# Patient Record
Sex: FEMALE | Race: WHITE | Employment: FULL TIME | URBAN - METROPOLITAN AREA
[De-identification: names, ages, dates, MRNs, and addresses within clinical notes are randomized per-mention and may not be internally consistent; named-entity substitution may affect disease eponyms.]

---

## 2017-03-03 RX ORDER — ESTAZOLAM 2 MG/1
TABLET ORAL
Qty: 21 TABLET | Refills: 5 | Status: SHIPPED | OUTPATIENT
Start: 2017-03-03 | End: 2017-06-23

## 2017-06-23 ENCOUNTER — TELEPHONE (OUTPATIENT)
Dept: FAMILY MEDICINE CLINIC | Facility: CLINIC | Age: 22
End: 2017-06-23

## 2017-06-23 RX ORDER — ESTAZOLAM 2 MG/1
1 TABLET ORAL
Qty: 84 TABLET | Refills: 1 | OUTPATIENT
Start: 2017-06-23 | End: 2017-12-07

## 2017-07-26 ENCOUNTER — TELEPHONE (OUTPATIENT)
Dept: FAMILY MEDICINE CLINIC | Facility: CLINIC | Age: 22
End: 2017-07-26

## 2017-07-26 NOTE — TELEPHONE ENCOUNTER
Mom would like patient to get the HPV series. She says patient needs it for her job. She needs to start series in the next 3 weeks. Please call mom back to schedule.

## 2017-07-26 NOTE — TELEPHONE ENCOUNTER
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    Order placed

## 2017-08-02 ENCOUNTER — NURSE ONLY (OUTPATIENT)
Dept: FAMILY MEDICINE CLINIC | Facility: CLINIC | Age: 22
End: 2017-08-02

## 2017-08-02 PROCEDURE — 90651 9VHPV VACCINE 2/3 DOSE IM: CPT | Performed by: FAMILY MEDICINE

## 2017-08-02 PROCEDURE — 90471 IMMUNIZATION ADMIN: CPT | Performed by: FAMILY MEDICINE

## 2017-08-02 NOTE — PROGRESS NOTES
Patient here for 1st HPV vaccine  Information sheet provided to patient - vaccine schedule provided to patient

## 2017-09-22 ENCOUNTER — TELEPHONE (OUTPATIENT)
Dept: FAMILY MEDICINE CLINIC | Facility: CLINIC | Age: 22
End: 2017-09-22

## 2017-09-22 NOTE — TELEPHONE ENCOUNTER
Forms from a physical done by a physician at [facility] where completed - patient asked if Dr. Magdalena Parada would also the forms.  Dr. Magdalena Parada advised that patient has not been seen by him since 2 2016 and he would need to see the patient for an OV to have the

## 2017-09-25 ENCOUNTER — CHARTING TRANS (OUTPATIENT)
Dept: OTHER | Age: 22
End: 2017-09-25

## 2017-11-10 ENCOUNTER — TELEPHONE (OUTPATIENT)
Dept: FAMILY MEDICINE CLINIC | Facility: CLINIC | Age: 22
End: 2017-11-10

## 2017-11-15 ENCOUNTER — NURSE ONLY (OUTPATIENT)
Dept: FAMILY MEDICINE CLINIC | Facility: CLINIC | Age: 22
End: 2017-11-15

## 2017-11-15 PROCEDURE — 90651 9VHPV VACCINE 2/3 DOSE IM: CPT | Performed by: FAMILY MEDICINE

## 2017-11-15 PROCEDURE — 90471 IMMUNIZATION ADMIN: CPT | Performed by: FAMILY MEDICINE

## 2017-12-07 RX ORDER — NORETHINDRONE ACETATE AND ETHINYL ESTRADIOL 1; .02 MG/1; MG/1
TABLET ORAL
Qty: 63 TABLET | Refills: 1 | Status: SHIPPED | OUTPATIENT
Start: 2017-12-07 | End: 2018-05-22

## 2018-02-14 ENCOUNTER — TELEPHONE (OUTPATIENT)
Dept: FAMILY MEDICINE CLINIC | Facility: CLINIC | Age: 23
End: 2018-02-14

## 2018-02-14 NOTE — TELEPHONE ENCOUNTER
Routing to Dr. Jeffery Garcia. Please advise on HPV order. Hpv Virus Vaccine 9 Adamaris Im 11/15/2017, 8/2/2017     Appointment scheduled for 3/14/18 (4 months after the 2nd injection). Thank you.

## 2018-03-13 ENCOUNTER — NURSE ONLY (OUTPATIENT)
Dept: FAMILY MEDICINE CLINIC | Facility: CLINIC | Age: 23
End: 2018-03-13

## 2018-03-13 PROCEDURE — 90471 IMMUNIZATION ADMIN: CPT | Performed by: FAMILY MEDICINE

## 2018-03-13 PROCEDURE — 90651 9VHPV VACCINE 2/3 DOSE IM: CPT | Performed by: FAMILY MEDICINE

## 2018-03-13 NOTE — PROGRESS NOTES
Patient in office for HPV # 3 patient states no issues with number 1 or 2. Dose administerd in the left deltoid. Patient instructed to wait 15 minutes she is agreeable with this plan. After 15 minutes. Patient left, no concerns.

## 2018-05-22 RX ORDER — NORETHINDRONE ACETATE AND ETHINYL ESTRADIOL 1; 20 MG/1; UG/1
TABLET ORAL
Qty: 63 TABLET | Refills: 0 | Status: SHIPPED | OUTPATIENT
Start: 2018-05-22 | End: 2018-08-16

## 2018-05-22 NOTE — TELEPHONE ENCOUNTER
Please let patient know or leave message that her birth control has been refilled. She has not been seen here in almost 2 years. Please have her set up a wellness visit in the near future.  Thanks

## 2018-05-25 ENCOUNTER — MED REC SCAN ONLY (OUTPATIENT)
Dept: FAMILY MEDICINE CLINIC | Facility: CLINIC | Age: 23
End: 2018-05-25

## 2018-08-16 RX ORDER — NORETHINDRONE ACETATE AND ETHINYL ESTRADIOL 1; 20 MG/1; UG/1
TABLET ORAL
Qty: 30 TABLET | Refills: 0 | Status: SHIPPED | OUTPATIENT
Start: 2018-08-16 | End: 2018-08-20

## 2018-08-16 NOTE — TELEPHONE ENCOUNTER
Please let patient know or leave message that Lalit Dejesus was refilled. She has not been seen in > 2 years. She will need a wellness visit in the very near future.  We can not continue to fill the medication with our follow up, thanks

## 2018-08-16 NOTE — TELEPHONE ENCOUNTER
Left message on patient's voice mail that the refill has been sent to the pharmacy.  Patient has an upcoming appointment on 8 20 2018

## 2018-08-16 NOTE — TELEPHONE ENCOUNTER
Last refilled on 5/22/18 for # 61 with 0 refills  Last seen on 8/16/16 (By Dr Ananda Medel)  Future Appointments  Date Time Provider Alix Coombs   8/20/2018 10:00 AM Erin Paige MD [facility] Alena Jonas        Thank you.

## 2018-08-20 ENCOUNTER — OFFICE VISIT (OUTPATIENT)
Dept: FAMILY MEDICINE CLINIC | Facility: CLINIC | Age: 23
End: 2018-08-20
Payer: COMMERCIAL

## 2018-08-20 VITALS
TEMPERATURE: 98 F | DIASTOLIC BLOOD PRESSURE: 80 MMHG | HEART RATE: 66 BPM | SYSTOLIC BLOOD PRESSURE: 120 MMHG | WEIGHT: 114.38 LBS | HEIGHT: 61.5 IN | RESPIRATION RATE: 14 BRPM | BODY MASS INDEX: 21.32 KG/M2

## 2018-08-20 DIAGNOSIS — Z00.00 WELL ADULT EXAM: Primary | ICD-10-CM

## 2018-08-20 PROCEDURE — 99395 PREV VISIT EST AGE 18-39: CPT | Performed by: FAMILY MEDICINE

## 2018-08-20 RX ORDER — NORETHINDRONE ACETATE AND ETHINYL ESTRADIOL 1; .02 MG/1; MG/1
1 TABLET ORAL
Qty: 90 TABLET | Refills: 3 | Status: SHIPPED | OUTPATIENT
Start: 2018-08-20 | End: 2019-07-08

## 2018-08-20 NOTE — PROGRESS NOTES
Ady Garcia is a 21year old female.     CC:  Patient presents with:  Physical: per pt       HPI:  Patient is here for yearly, wellness exam  Last Lipid: never  Last colonoscopy: na      Immunization History  Administered            Date(s) Administ Resp 14   Ht 61.5\"   Wt 114 lb 6.4 oz   LMP 08/19/2018   BMI 21.27 kg/m²    Reviewed by Paramjit Kessler M.D.     Physical Exam:  GEN: well developed, well nourished, in no apparent distress  EYE: B conjunctiva and lids normal  HENT: normocephalic; normal nose,

## 2018-08-29 ENCOUNTER — MED REC SCAN ONLY (OUTPATIENT)
Dept: FAMILY MEDICINE CLINIC | Facility: CLINIC | Age: 23
End: 2018-08-29

## 2018-08-29 LAB
A1C: 4.9 %
GLUCOSE BLOOD: 84
HDL CHOLESTEROL: 57 MG/DL
LDL CHOLESTEROL: 47 MG/DL (ref ?–130)
TOTAL CHOLESTEROL: 141 MG/DL (ref ?–200)
TRIGLYCERIDES: 185 MG/DL

## 2018-11-02 VITALS
RESPIRATION RATE: 18 BRPM | SYSTOLIC BLOOD PRESSURE: 114 MMHG | BODY MASS INDEX: 20.82 KG/M2 | WEIGHT: 117.5 LBS | HEART RATE: 64 BPM | TEMPERATURE: 98.8 F | HEIGHT: 63 IN | DIASTOLIC BLOOD PRESSURE: 66 MMHG

## 2019-02-08 ENCOUNTER — OFFICE VISIT (OUTPATIENT)
Dept: FAMILY MEDICINE CLINIC | Facility: CLINIC | Age: 24
End: 2019-02-08
Payer: COMMERCIAL

## 2019-02-08 VITALS
DIASTOLIC BLOOD PRESSURE: 68 MMHG | RESPIRATION RATE: 16 BRPM | WEIGHT: 118 LBS | HEIGHT: 61 IN | HEART RATE: 83 BPM | SYSTOLIC BLOOD PRESSURE: 112 MMHG | BODY MASS INDEX: 22.28 KG/M2 | OXYGEN SATURATION: 99 % | TEMPERATURE: 98 F

## 2019-02-08 DIAGNOSIS — H93.8X2 SENSATION OF FULLNESS IN LEFT EAR: Primary | ICD-10-CM

## 2019-02-08 PROCEDURE — 99213 OFFICE O/P EST LOW 20 MIN: CPT | Performed by: FAMILY MEDICINE

## 2019-02-08 RX ORDER — SPIRONOLACTONE 25 MG/1
25 TABLET ORAL
Refills: 2 | COMMUNITY
Start: 2018-12-03 | End: 2021-12-03

## 2019-02-08 NOTE — PROGRESS NOTES
HPI:    Patient ID: Kaleb Guan is a 21year old female.     Patient presents with:  Ear Problem: left sided, decreased hearing and patients states she hears \"popping\" noise - For a few months intermittently      HPI  Patient is here for fullness in Ear: Tympanic membrane, external ear and ear canal normal. Tympanic membrane is not erythematous, not retracted and not bulging. Nose: Nose normal. Right sinus exhibits no maxillary sinus tenderness and no frontal sinus tenderness.  Left sinus exhibits no

## 2019-03-01 ENCOUNTER — OFFICE VISIT (OUTPATIENT)
Dept: FAMILY MEDICINE CLINIC | Facility: CLINIC | Age: 24
End: 2019-03-01
Payer: COMMERCIAL

## 2019-03-01 VITALS
TEMPERATURE: 97 F | HEART RATE: 68 BPM | OXYGEN SATURATION: 99 % | DIASTOLIC BLOOD PRESSURE: 76 MMHG | BODY MASS INDEX: 21.3 KG/M2 | WEIGHT: 112.81 LBS | SYSTOLIC BLOOD PRESSURE: 110 MMHG | HEIGHT: 61 IN | RESPIRATION RATE: 16 BRPM

## 2019-03-01 DIAGNOSIS — H69.82 DYSFUNCTION OF LEFT EUSTACHIAN TUBE: ICD-10-CM

## 2019-03-01 DIAGNOSIS — H73.892 RETRACTED EAR DRUM, LEFT: Primary | ICD-10-CM

## 2019-03-01 DIAGNOSIS — R09.81 NASAL CONGESTION: ICD-10-CM

## 2019-03-01 PROCEDURE — 99214 OFFICE O/P EST MOD 30 MIN: CPT | Performed by: FAMILY MEDICINE

## 2019-03-01 NOTE — PROGRESS NOTES
Christopher Chavez is a 21year old female. CC:  Patient presents with:  Ear Problem: Pt states left ear has been popping randomly. x 2 months. States no drainage or pain.   Other: Pt due for chlamydia screening and flu vaccine      HPI:  The patient ha or masses  CAR: S1, S2 normal, RRR; no S3, no S4; no click; murmur negative  PULM: clear to auscultation B, no accessory muscle use  GI: not examined  PSYCH: alert and oriented x 3; affect appropriate  SKIN: not examined  BREAST: not examined/not applicabl

## 2019-03-23 ENCOUNTER — MED REC SCAN ONLY (OUTPATIENT)
Dept: FAMILY MEDICINE CLINIC | Facility: CLINIC | Age: 24
End: 2019-03-23

## 2019-07-08 RX ORDER — NORETHINDRONE ACETATE AND ETHINYL ESTRADIOL 1; 20 MG/1; UG/1
TABLET ORAL
Qty: 84 TABLET | Refills: 3 | Status: SHIPPED | OUTPATIENT
Start: 2019-07-08 | End: 2020-06-01

## 2019-08-22 LAB
AMB EXT CHOLESTEROL, TOTAL: 158 MG/DL
AMB EXT GLUCOSE: 79 MG/DL
AMB EXT HDL CHOLESTEROL: 64 MG/DL
AMB EXT HGBA1C: 5.1 %
AMB EXT LDL CHOLESTEROL, DIRECT: 79 MG/DL
AMB EXT TRIGLYCERIDES: 74 MG/DL

## 2019-08-29 ENCOUNTER — MED REC SCAN ONLY (OUTPATIENT)
Dept: FAMILY MEDICINE CLINIC | Facility: CLINIC | Age: 24
End: 2019-08-29

## 2019-09-09 ENCOUNTER — TELEPHONE (OUTPATIENT)
Dept: FAMILY MEDICINE CLINIC | Facility: CLINIC | Age: 24
End: 2019-09-09

## 2019-09-09 NOTE — TELEPHONE ENCOUNTER
Patient states that she takes Spironolactone and some of her lab values came back elevated. She is wondering if she should stop the medication. She spoke with the dermatologist and was advised to continue the medication for another month.  She would like fo

## 2019-09-09 NOTE — TELEPHONE ENCOUNTER
Left message on patients voice mail with the information per Dr. Lubna Ross. Phone number provided to patient to call the office and make an appointment.

## 2019-09-23 ENCOUNTER — OFFICE VISIT (OUTPATIENT)
Dept: FAMILY MEDICINE CLINIC | Facility: CLINIC | Age: 24
End: 2019-09-23
Payer: COMMERCIAL

## 2019-09-23 VITALS
RESPIRATION RATE: 16 BRPM | SYSTOLIC BLOOD PRESSURE: 100 MMHG | DIASTOLIC BLOOD PRESSURE: 70 MMHG | TEMPERATURE: 98 F | HEART RATE: 68 BPM | BODY MASS INDEX: 19.38 KG/M2 | WEIGHT: 104 LBS | HEIGHT: 61.5 IN

## 2019-09-23 DIAGNOSIS — Z01.419 WELL WOMAN EXAM WITH ROUTINE GYNECOLOGICAL EXAM: Primary | ICD-10-CM

## 2019-09-23 DIAGNOSIS — N92.6 IRREGULAR PERIODS: ICD-10-CM

## 2019-09-23 LAB
ALBUMIN SERPL-MCNC: 4.2 G/DL (ref 3.4–5)
ALBUMIN/GLOB SERPL: 1.1 {RATIO} (ref 1–2)
ALP LIVER SERPL-CCNC: 65 U/L (ref 37–98)
ALT SERPL-CCNC: 20 U/L (ref 13–56)
ANION GAP SERPL CALC-SCNC: 7 MMOL/L (ref 0–18)
AST SERPL-CCNC: 16 U/L (ref 15–37)
BASOPHILS # BLD AUTO: 0.06 X10(3) UL (ref 0–0.2)
BASOPHILS NFR BLD AUTO: 0.7 %
BILIRUB SERPL-MCNC: 0.9 MG/DL (ref 0.1–2)
BUN BLD-MCNC: 13 MG/DL (ref 7–18)
BUN/CREAT SERPL: 11.4 (ref 10–20)
CALCIUM BLD-MCNC: 9.1 MG/DL (ref 8.5–10.1)
CHLORIDE SERPL-SCNC: 106 MMOL/L (ref 98–112)
CO2 SERPL-SCNC: 27 MMOL/L (ref 21–32)
CREAT BLD-MCNC: 1.14 MG/DL (ref 0.55–1.02)
DEPRECATED HBV CORE AB SER IA-ACNC: 52.3 NG/ML (ref 12–114)
DEPRECATED RDW RBC AUTO: 39.1 FL (ref 35.1–46.3)
EOSINOPHIL # BLD AUTO: 0.06 X10(3) UL (ref 0–0.7)
EOSINOPHIL NFR BLD AUTO: 0.7 %
ERYTHROCYTE [DISTWIDTH] IN BLOOD BY AUTOMATED COUNT: 11.7 % (ref 11–15)
GLOBULIN PLAS-MCNC: 3.8 G/DL (ref 2.8–4.4)
GLUCOSE BLD-MCNC: 87 MG/DL (ref 70–99)
HCT VFR BLD AUTO: 45.7 % (ref 35–48)
HGB BLD-MCNC: 14.9 G/DL (ref 12–16)
IMM GRANULOCYTES # BLD AUTO: 0.03 X10(3) UL (ref 0–1)
IMM GRANULOCYTES NFR BLD: 0.4 %
LYMPHOCYTES # BLD AUTO: 2.11 X10(3) UL (ref 1–4)
LYMPHOCYTES NFR BLD AUTO: 25.1 %
M PROTEIN MFR SERPL ELPH: 8 G/DL (ref 6.4–8.2)
MCH RBC QN AUTO: 29.9 PG (ref 26–34)
MCHC RBC AUTO-ENTMCNC: 32.6 G/DL (ref 31–37)
MCV RBC AUTO: 91.8 FL (ref 80–100)
MONOCYTES # BLD AUTO: 0.46 X10(3) UL (ref 0.1–1)
MONOCYTES NFR BLD AUTO: 5.5 %
NEUTROPHILS # BLD AUTO: 5.67 X10 (3) UL (ref 1.5–7.7)
NEUTROPHILS # BLD AUTO: 5.67 X10(3) UL (ref 1.5–7.7)
NEUTROPHILS NFR BLD AUTO: 67.6 %
OSMOLALITY SERPL CALC.SUM OF ELEC: 289 MOSM/KG (ref 275–295)
PLATELET # BLD AUTO: 329 10(3)UL (ref 150–450)
POTASSIUM SERPL-SCNC: 4.1 MMOL/L (ref 3.5–5.1)
RBC # BLD AUTO: 4.98 X10(6)UL (ref 3.8–5.3)
SODIUM SERPL-SCNC: 140 MMOL/L (ref 136–145)
TSI SER-ACNC: 2.2 MIU/ML (ref 0.36–3.74)
WBC # BLD AUTO: 8.4 X10(3) UL (ref 4–11)

## 2019-09-23 PROCEDURE — 88175 CYTOPATH C/V AUTO FLUID REDO: CPT | Performed by: FAMILY MEDICINE

## 2019-09-23 PROCEDURE — 87591 N.GONORRHOEAE DNA AMP PROB: CPT | Performed by: FAMILY MEDICINE

## 2019-09-23 PROCEDURE — 82728 ASSAY OF FERRITIN: CPT | Performed by: FAMILY MEDICINE

## 2019-09-23 PROCEDURE — 80050 GENERAL HEALTH PANEL: CPT | Performed by: FAMILY MEDICINE

## 2019-09-23 PROCEDURE — 99395 PREV VISIT EST AGE 18-39: CPT | Performed by: FAMILY MEDICINE

## 2019-09-23 PROCEDURE — 36415 COLL VENOUS BLD VENIPUNCTURE: CPT | Performed by: FAMILY MEDICINE

## 2019-09-23 PROCEDURE — 87491 CHLMYD TRACH DNA AMP PROBE: CPT | Performed by: FAMILY MEDICINE

## 2019-09-23 NOTE — PROGRESS NOTES
HPI:   Yolande Birmingham is a 25year old female who presents for a complete physical exam. Symptoms: denies discharge, itching, burning or dysuria, periods are irregular. Patient complains of irregular periods.      Irregular periods: getting them every o 1.2-0.025 % External Gel APPLY TO FACE EVERY NIGHT Disp:  Rfl: 3   spironolactone 25 MG Oral Tab Take 25 mg by mouth once daily. Disp:  Rfl: 2      Past Medical History:   Diagnosis Date   • Acne       History reviewed. No pertinent surgical history.    His MUSCULOSKELETAL: back is not tender,FROM of the back  EXTREMITIES: no cyanosis, clubbing or edema  NEURO: Oriented times three,cranial nerves are intact,motor and sensory are grossly intact    ASSESSMENT AND PLAN:   Gregory Brown is a 25year old fem

## 2019-09-24 LAB
C TRACH DNA SPEC QL NAA+PROBE: NEGATIVE
N GONORRHOEA DNA SPEC QL NAA+PROBE: NEGATIVE

## 2019-09-25 ENCOUNTER — TELEPHONE (OUTPATIENT)
Dept: FAMILY MEDICINE CLINIC | Facility: CLINIC | Age: 24
End: 2019-09-25

## 2019-09-25 NOTE — TELEPHONE ENCOUNTER
----- Message from Myron Chow DO sent at 9/24/2019 10:33 PM CDT -----  Pls let pt know that her pap is still pending, but labs are back. Blood cell count, iron, and thyroid are normal. Liver tests are normal. Her kidney function is a little sluggish.

## 2020-01-06 ENCOUNTER — TELEPHONE (OUTPATIENT)
Dept: FAMILY MEDICINE CLINIC | Facility: CLINIC | Age: 25
End: 2020-01-06

## 2020-01-06 DIAGNOSIS — N28.9 ABNORMAL KIDNEY FUNCTION: Primary | ICD-10-CM

## 2020-01-06 NOTE — TELEPHONE ENCOUNTER
Letter mailed to patient reminding her she is due for labs.     Lab Frequency Next Occurrence   BASIC METABOLIC PANEL (8) Once 12/20/3685

## 2020-03-05 ENCOUNTER — TELEPHONE (OUTPATIENT)
Dept: FAMILY MEDICINE CLINIC | Facility: CLINIC | Age: 25
End: 2020-03-05

## 2020-03-30 ENCOUNTER — TELEPHONE (OUTPATIENT)
Dept: SCHEDULING | Age: 25
End: 2020-03-30

## 2020-03-30 ENCOUNTER — WALK IN (OUTPATIENT)
Dept: URGENT CARE | Age: 25
End: 2020-03-30

## 2020-03-30 DIAGNOSIS — J02.9 PHARYNGITIS, UNSPECIFIED ETIOLOGY: Primary | ICD-10-CM

## 2020-03-30 PROCEDURE — 87880 STREP A ASSAY W/OPTIC: CPT | Performed by: FAMILY MEDICINE

## 2020-03-30 PROCEDURE — 99203 OFFICE O/P NEW LOW 30 MIN: CPT | Performed by: FAMILY MEDICINE

## 2020-03-30 RX ORDER — AMOXICILLIN AND CLAVULANATE POTASSIUM 875; 125 MG/1; MG/1
1 TABLET, FILM COATED ORAL EVERY 12 HOURS
Qty: 20 TABLET | Refills: 0 | Status: SHIPPED | OUTPATIENT
Start: 2020-03-30

## 2020-03-30 RX ORDER — NORETHINDRONE ACETATE AND ETHINYL ESTRADIOL 1; 20 MG/1; UG/1
1 TABLET ORAL DAILY
COMMUNITY
Start: 2020-03-08

## 2020-03-30 RX ORDER — CLINDAMYCIN PHOSPHATE AND TRETINOIN 10; .25 MG/G; MG/G
GEL TOPICAL
COMMUNITY
Start: 2018-11-09

## 2020-03-30 RX ORDER — SPIRONOLACTONE 50 MG/1
50 TABLET, FILM COATED ORAL EVERY MORNING
COMMUNITY
Start: 2020-02-03

## 2020-03-30 ASSESSMENT — PAIN SCALES - GENERAL: PAINLEVEL: 3-4

## 2020-06-01 RX ORDER — NORETHINDRONE ACETATE AND ETHINYL ESTRADIOL 1; 20 MG/1; UG/1
TABLET ORAL
Qty: 84 TABLET | Refills: 3 | Status: SHIPPED | OUTPATIENT
Start: 2020-06-01 | End: 2021-05-17

## 2021-01-29 ENCOUNTER — TELEPHONE (OUTPATIENT)
Dept: FAMILY MEDICINE CLINIC | Facility: CLINIC | Age: 26
End: 2021-01-29

## 2021-01-29 NOTE — TELEPHONE ENCOUNTER
Pt called, Pt needs a TDAP and TB for school but isn't sure if it is 1 step or 2 step. Would we now?   Please call pt at [phone]

## 2021-01-29 NOTE — TELEPHONE ENCOUNTER
Patient is going to check with the school. She was advised that there is also the TB Gold blood test that can be done. She will double check and will call back to schedule the Tdap and TB testing.

## 2021-03-25 NOTE — TELEPHONE ENCOUNTER
Last OV 2-5-2016      (8- - Chanell)  Last refilled 12/7/17  #63  1 refill    No future appointments. Admission Reconciliation is Not Complete  Discharge Reconciliation is Not Complete Admission Reconciliation is Completed  Discharge Reconciliation is Completed

## 2021-05-15 NOTE — TELEPHONE ENCOUNTER
Gynecology Medication Protocol [id number]/15/2021 10:26 AM   Physical or Pelvic/Breast in past 12 or next 3 mos--VIA MANUAL LOOKUP    PASS-PENDING LAST PAP WNL--VIA MANUAL LOOKUP     Routing to provider per protocol.     Last refilled on 6/1/20 for # 80 with

## 2021-05-17 RX ORDER — NORETHINDRONE ACETATE AND ETHINYL ESTRADIOL 1; 20 MG/1; UG/1
TABLET ORAL
Qty: 84 TABLET | Refills: 3 | Status: SHIPPED | OUTPATIENT
Start: 2021-05-17

## 2021-05-26 VITALS
OXYGEN SATURATION: 98 % | DIASTOLIC BLOOD PRESSURE: 82 MMHG | RESPIRATION RATE: 16 BRPM | HEART RATE: 86 BPM | SYSTOLIC BLOOD PRESSURE: 139 MMHG | TEMPERATURE: 101.6 F

## 2021-12-03 ENCOUNTER — PATIENT MESSAGE (OUTPATIENT)
Dept: FAMILY MEDICINE CLINIC | Facility: CLINIC | Age: 26
End: 2021-12-03

## 2021-12-03 ENCOUNTER — TELEMEDICINE (OUTPATIENT)
Dept: FAMILY MEDICINE CLINIC | Facility: CLINIC | Age: 26
End: 2021-12-03
Payer: COMMERCIAL

## 2021-12-03 DIAGNOSIS — R59.0 CERVICAL LYMPHADENOPATHY: ICD-10-CM

## 2021-12-03 DIAGNOSIS — J03.90 EXUDATIVE TONSILLITIS: Primary | ICD-10-CM

## 2021-12-03 PROCEDURE — 99214 OFFICE O/P EST MOD 30 MIN: CPT | Performed by: FAMILY MEDICINE

## 2021-12-03 RX ORDER — PREDNISONE 20 MG/1
TABLET ORAL
Qty: 18 TABLET | Refills: 0 | Status: SHIPPED | OUTPATIENT
Start: 2021-12-03

## 2021-12-03 RX ORDER — AMOXICILLIN AND CLAVULANATE POTASSIUM 500; 125 MG/1; MG/1
TABLET, FILM COATED ORAL
Qty: 20 TABLET | Refills: 0 | Status: SHIPPED | OUTPATIENT
Start: 2021-12-03 | End: 2021-12-13

## 2021-12-03 NOTE — PROGRESS NOTES
My Chart/ Video/Telephone Visit Check-In Due to [address] verbally consents a video Check-In service on 12/03/21.   Patient understands and accepts financial responsibility for any deductible, co-insurance and/or co-pays associat as directed. Rest and push fluids. For the sore throat patient can do salt water gargles, throat lozenges, Tylenol  for the discomfort     2.  Cervical lymphadenopathy  As above       Orders for this visit:    No orders of the defined types were placed i

## 2022-01-22 ENCOUNTER — TELEPHONE (OUTPATIENT)
Dept: FAMILY MEDICINE CLINIC | Facility: CLINIC | Age: 27
End: 2022-01-22

## 2022-01-22 NOTE — TELEPHONE ENCOUNTER
Pt called she is coming in   Future Appointments   Date Time Provider Alix Coombs   2/7/2022 10:00 AM  [facility] Vandana Dillard   2/17/2022  2:00 PM DO ZAFAR Crawford [facility] Vandana Dillard     She is needing to have a 1 step tb test done

## 2022-02-17 ENCOUNTER — OFFICE VISIT (OUTPATIENT)
Dept: FAMILY MEDICINE CLINIC | Facility: CLINIC | Age: 27
End: 2022-02-17
Payer: COMMERCIAL

## 2022-02-17 VITALS
DIASTOLIC BLOOD PRESSURE: 70 MMHG | RESPIRATION RATE: 16 BRPM | WEIGHT: 108 LBS | BODY MASS INDEX: 20.39 KG/M2 | SYSTOLIC BLOOD PRESSURE: 122 MMHG | TEMPERATURE: 99 F | OXYGEN SATURATION: 98 % | HEIGHT: 61 IN | HEART RATE: 84 BPM

## 2022-02-17 DIAGNOSIS — R11.0 NAUSEA: ICD-10-CM

## 2022-02-17 DIAGNOSIS — K30 STOMACH UPSET: ICD-10-CM

## 2022-02-17 DIAGNOSIS — Z30.41 ENCOUNTER FOR SURVEILLANCE OF CONTRACEPTIVE PILLS: ICD-10-CM

## 2022-02-17 DIAGNOSIS — R79.89 ELEVATED SERUM CREATININE: ICD-10-CM

## 2022-02-17 DIAGNOSIS — Z01.419 WELL WOMAN EXAM WITH ROUTINE GYNECOLOGICAL EXAM: Primary | ICD-10-CM

## 2022-02-17 PROCEDURE — 87625 HPV TYPES 16 & 18 ONLY: CPT | Performed by: FAMILY MEDICINE

## 2022-02-17 PROCEDURE — 88175 CYTOPATH C/V AUTO FLUID REDO: CPT | Performed by: FAMILY MEDICINE

## 2022-02-17 PROCEDURE — 3078F DIAST BP <80 MM HG: CPT | Performed by: FAMILY MEDICINE

## 2022-02-17 PROCEDURE — 87624 HPV HI-RISK TYP POOLED RSLT: CPT | Performed by: FAMILY MEDICINE

## 2022-02-17 PROCEDURE — 99395 PREV VISIT EST AGE 18-39: CPT | Performed by: FAMILY MEDICINE

## 2022-02-17 PROCEDURE — 3008F BODY MASS INDEX DOCD: CPT | Performed by: FAMILY MEDICINE

## 2022-02-17 PROCEDURE — 3074F SYST BP LT 130 MM HG: CPT | Performed by: FAMILY MEDICINE

## 2022-02-17 RX ORDER — NORETHINDRONE ACETATE AND ETHINYL ESTRADIOL 1; .02 MG/1; MG/1
1 TABLET ORAL DAILY
Qty: 84 TABLET | Refills: 3 | Status: SHIPPED | OUTPATIENT
Start: 2022-02-17

## 2022-02-19 LAB
BUN: 10 MG/DL (ref 7–25)
CALCIUM: 9 MG/DL (ref 8.6–10.2)
CARBON DIOXIDE: 27 MMOL/L (ref 20–32)
CHLORIDE: 108 MMOL/L (ref 98–110)
CREATININE: 0.92 MG/DL (ref 0.5–1.1)
EGFR IF AFRICN AM: 100 ML/MIN/1.73M2
EGFR IF NONAFRICN AM: 86 ML/MIN/1.73M2
GLUCOSE: 58 MG/DL (ref 65–99)
POTASSIUM: 4.3 MMOL/L (ref 3.5–5.3)
SODIUM: 142 MMOL/L (ref 135–146)

## 2022-03-03 LAB
HPV I/H RISK 1 DNA SPEC QL NAA+PROBE: POSITIVE
HPV16 DNA CVX QL PROBE+SIG AMP: NEGATIVE
HPV18 DNA CVX QL PROBE+SIG AMP: NEGATIVE

## 2022-03-04 ENCOUNTER — TELEPHONE (OUTPATIENT)
Dept: FAMILY MEDICINE CLINIC | Facility: CLINIC | Age: 27
End: 2022-03-04

## 2022-03-04 NOTE — TELEPHONE ENCOUNTER
Called and LMTCB. No details given. Pap showed ASCUS and positive HPV. This is not cancer, and will likely go away on it's own, but there is a small chance it won't go away and may progress. I would like her to see gyne for a colposcopy, which is like a pap test but with a microscope. They may do a biopsy as well. Refer to Dr. Juventino Jamison and group. Referral placed. pls give her info.

## 2022-03-04 NOTE — TELEPHONE ENCOUNTER
See other telephone message. Patient has been notified, verbalized understanding of information. Denies further questions.

## 2022-03-31 ENCOUNTER — TELEPHONE (OUTPATIENT)
Dept: FAMILY MEDICINE CLINIC | Facility: CLINIC | Age: 27
End: 2022-03-31

## 2022-07-25 ENCOUNTER — PATIENT MESSAGE (OUTPATIENT)
Dept: FAMILY MEDICINE CLINIC | Facility: CLINIC | Age: 27
End: 2022-07-25

## 2022-07-25 ENCOUNTER — TELEPHONE (OUTPATIENT)
Dept: FAMILY MEDICINE CLINIC | Facility: CLINIC | Age: 27
End: 2022-07-25

## 2022-07-26 ENCOUNTER — PATIENT MESSAGE (OUTPATIENT)
Dept: FAMILY MEDICINE CLINIC | Facility: CLINIC | Age: 27
End: 2022-07-26

## 2022-07-26 NOTE — TELEPHONE ENCOUNTER
From: Elisabeth Tang  To: Breenice Reeder DO  Sent: 7/26/2022 8:59 AM CDT  Subject: Pre-Employment screening    Hi Dr. Mallory Espana,    I was wondering if you could do this pre-employment screening for me? I had a physical done in February. I can pick it up today or next week. Let me know. Thanks.

## 2022-07-26 NOTE — TELEPHONE ENCOUNTER
Please let patient or caregiver know or leave message that:   Order for PPD placed. Please schedule her a nurse visit.   Thanks

## 2022-07-29 ENCOUNTER — MED REC SCAN ONLY (OUTPATIENT)
Dept: FAMILY MEDICINE CLINIC | Facility: CLINIC | Age: 27
End: 2022-07-29

## 2024-05-01 ENCOUNTER — OFFICE VISIT (OUTPATIENT)
Dept: FAMILY MEDICINE CLINIC | Facility: CLINIC | Age: 29
End: 2024-05-01
Payer: COMMERCIAL

## 2024-05-01 VITALS
DIASTOLIC BLOOD PRESSURE: 70 MMHG | RESPIRATION RATE: 16 BRPM | HEART RATE: 79 BPM | OXYGEN SATURATION: 99 % | SYSTOLIC BLOOD PRESSURE: 102 MMHG | BODY MASS INDEX: 20 KG/M2 | WEIGHT: 107 LBS | TEMPERATURE: 98 F

## 2024-05-01 DIAGNOSIS — R09.82 POST-NASAL DRIP: ICD-10-CM

## 2024-05-01 DIAGNOSIS — Z00.00 WELL ADULT EXAM: Primary | ICD-10-CM

## 2024-05-01 LAB
ALBUMIN SERPL-MCNC: 3.9 G/DL (ref 3.4–5)
ALBUMIN/GLOB SERPL: 1 {RATIO} (ref 1–2)
ALP LIVER SERPL-CCNC: 62 U/L
ALT SERPL-CCNC: 17 U/L
ANION GAP SERPL CALC-SCNC: 6 MMOL/L (ref 0–18)
AST SERPL-CCNC: 12 U/L (ref 15–37)
BILIRUB SERPL-MCNC: 0.5 MG/DL (ref 0.1–2)
BUN BLD-MCNC: 18 MG/DL (ref 9–23)
CALCIUM BLD-MCNC: 9.4 MG/DL (ref 8.5–10.1)
CHLORIDE SERPL-SCNC: 107 MMOL/L (ref 98–112)
CHOLEST SERPL-MCNC: 157 MG/DL (ref ?–200)
CO2 SERPL-SCNC: 26 MMOL/L (ref 21–32)
CREAT BLD-MCNC: 1.09 MG/DL
EGFRCR SERPLBLD CKD-EPI 2021: 71 ML/MIN/1.73M2 (ref 60–?)
ERYTHROCYTE [DISTWIDTH] IN BLOOD BY AUTOMATED COUNT: 12.1 %
FASTING PATIENT LIPID ANSWER: NO
FASTING STATUS PATIENT QL REPORTED: NO
GLOBULIN PLAS-MCNC: 3.9 G/DL (ref 2.8–4.4)
GLUCOSE BLD-MCNC: 91 MG/DL (ref 70–99)
HCT VFR BLD AUTO: 43.9 %
HDLC SERPL-MCNC: 67 MG/DL (ref 40–59)
HGB BLD-MCNC: 14.3 G/DL
LDLC SERPL CALC-MCNC: 62 MG/DL (ref ?–100)
MCH RBC QN AUTO: 29.7 PG (ref 26–34)
MCHC RBC AUTO-ENTMCNC: 32.6 G/DL (ref 31–37)
MCV RBC AUTO: 91.3 FL
NONHDLC SERPL-MCNC: 90 MG/DL (ref ?–130)
OSMOLALITY SERPL CALC.SUM OF ELEC: 289 MOSM/KG (ref 275–295)
PLATELET # BLD AUTO: 336 10(3)UL (ref 150–450)
POTASSIUM SERPL-SCNC: 3.8 MMOL/L (ref 3.5–5.1)
PROT SERPL-MCNC: 7.8 G/DL (ref 6.4–8.2)
RBC # BLD AUTO: 4.81 X10(6)UL
SODIUM SERPL-SCNC: 139 MMOL/L (ref 136–145)
TRIGL SERPL-MCNC: 167 MG/DL (ref 30–149)
TSI SER-ACNC: 2.46 MIU/ML (ref 0.36–3.74)
VLDLC SERPL CALC-MCNC: 25 MG/DL (ref 0–30)
WBC # BLD AUTO: 10.9 X10(3) UL (ref 4–11)

## 2024-05-01 PROCEDURE — 3074F SYST BP LT 130 MM HG: CPT | Performed by: FAMILY MEDICINE

## 2024-05-01 PROCEDURE — 80061 LIPID PANEL: CPT | Performed by: FAMILY MEDICINE

## 2024-05-01 PROCEDURE — 99395 PREV VISIT EST AGE 18-39: CPT | Performed by: FAMILY MEDICINE

## 2024-05-01 PROCEDURE — 3078F DIAST BP <80 MM HG: CPT | Performed by: FAMILY MEDICINE

## 2024-05-01 PROCEDURE — 80050 GENERAL HEALTH PANEL: CPT | Performed by: FAMILY MEDICINE

## 2024-05-01 NOTE — PROGRESS NOTES
Rowena Morton is a 28 year old female.    CC:    Chief Complaint   Patient presents with    Physical       HPI:  Patient is here for yearly, wellness exam  Last Lipid:  Lab Results   Component Value Date    CHOLEST 158 08/22/2019    TRIG 74 08/22/2019    HDL 64 08/22/2019    LDL 47 08/27/2018       Last colon cancer screen:  na    Last mammo: na    Last Pap: 02/17/2022      Immunization History   Administered Date(s) Administered    Covid-19 Vaccine Moderna 100 mcg/0.5 ml 12/30/2020, 01/27/2021, 11/15/2021    DTAP 08/01/1995, 10/03/1995, 01/03/1996, 09/07/1996, 08/03/2000    Flucelvax 0.5ml Vaccine 09/28/2022, 09/18/2023    HEP B 06/01/1995, 08/01/1995, 01/03/1996, 07/30/2014, 10/10/2014, 03/06/2015    HIB 08/01/1995, 10/03/1995, 01/03/1996, 09/07/1996    Hpv Virus Vaccine 9 Adamaris Im 08/02/2017, 11/15/2017, 03/13/2018    IPV 08/01/1995, 10/03/1995, 01/03/1996, 08/03/2000    Influenza 11/04/2021, 09/28/2022    MMR 09/07/1996, 08/03/2000    OPV 08/01/1995, 10/11/1995, 01/03/1996    TDAP 08/05/2009    TYPHOID 04/29/2015          There is no problem list on file for this patient.     Allergies:  No Known Allergies   Current Meds:  Current Outpatient Medications   Medication Sig Dispense Refill    Norethindrone Acet-Ethinyl Est (JUNEL 1/20) 1-20 MG-MCG Oral Tab Take 1 tablet by mouth daily. 84 tablet 3        History:  Past Medical History:    Acne      No past surgical history on file.   Family History   Problem Relation Age of Onset    Hypertension Father     Hyperlipidemia Father       Family Status   Relation Status    Mo Alive    Fa Alive      Social History     Socioeconomic History    Marital status: Single   Tobacco Use    Smoking status: Never    Smokeless tobacco: Never   Substance and Sexual Activity    Alcohol use: Yes     Alcohol/week: 0.0 standard drinks of alcohol     Comment: socially    Drug use: No     Social Determinants of Health      Received from [facility]    Social  Connections    Received from [facility]    Housing Stability        ROS:  General: energy level stable  ENT: Notes accumulation of mucous in her throat, worse in the am, clears he throat frequently. Has also experienced an increase in URI type issues the past year     Vitals: /70 (BP Location: Left arm, Patient Position: Sitting, Cuff Size: adult)   Pulse 79   Temp 98 °F (36.7 °C)   Resp 16   Wt 107 lb (48.5 kg)   SpO2 99%   BMI 20.22 kg/m²    Reviewed by GENEVIEVE Lau M.D.    Physical Exam:  GEN: well developed, well nourished, in no apparent distress  EYE: B conjunctiva and lids normal  HENT: normocephalic; normal pharynx and TM's  NECK: No lymphadenopathy, thyromegaly or masses  CAR: S1, S2 normal, RRR; no S3, no S4; no click; murmur negative  PULM: clear to auscultation B, no accessory muscle use  GI: normal active BS+, soft, nondistended; no HSM; no masses; no bruits; no masses; nontender, no G/R/R   PSYCH: alert and oriented x 3; affect appropriate  SKIN: not examined  EXTREMITIES: No clubbing, cyanosis or edema  GENITAL: not examined  LYMPH: no supraclavicular nodes  NEURO: Awake and alert. Normal speech and articulation. No facial droop or asymmetry. Moving all extremities equally.    ASSESSMENT AND PLAN    1. Well adult exam  Await results   She believes she is UTD with Tdap. She will check her records and let me know. If not then Tdap can be done in office or local pharmacy  - VENIPUNCTURE  - Comp Metabolic Panel (14); Future  - CBC, Platelet; No Differential; Future  - TSH W Reflex To Free T4; Future  - Lipid Panel; Future    2. Post-nasal drip    - ENT Referral - In Network      No follow-ups on file.    Orders for this visit:    Orders Placed This Encounter   Procedures    Comp Metabolic Panel (14)     Standing Status:   Future     Standing Expiration Date:   5/1/2025    CBC, Platelet; No Differential     Standing Status:   Future     Standing Expiration Date:   5/1/2025     TSH W Reflex To Free T4     Standing Status:   Future     Standing Expiration Date:   5/1/2025    Lipid Panel     Standing Status:   Future     Standing Expiration Date:   5/1/2025    VENIPUNCTURE     Order Specific Question:   Release to patient     Answer:   Immediate       ENT - INTERNAL    Meds & Refills for this Visit:  Requested Prescriptions      No prescriptions requested or ordered in this encounter             Authorized by Aroldo Lau M.D.

## 2024-05-25 ENCOUNTER — OFFICE VISIT (OUTPATIENT)
Dept: FAMILY MEDICINE CLINIC | Facility: CLINIC | Age: 29
End: 2024-05-25

## 2024-05-25 VITALS
BODY MASS INDEX: 19.26 KG/M2 | TEMPERATURE: 100 F | HEIGHT: 61 IN | HEART RATE: 79 BPM | SYSTOLIC BLOOD PRESSURE: 120 MMHG | WEIGHT: 102 LBS | DIASTOLIC BLOOD PRESSURE: 84 MMHG | OXYGEN SATURATION: 97 %

## 2024-05-25 DIAGNOSIS — J30.9 ALLERGIC RHINITIS, UNSPECIFIED SEASONALITY, UNSPECIFIED TRIGGER: ICD-10-CM

## 2024-05-25 DIAGNOSIS — J03.90 TONSILLITIS: Primary | ICD-10-CM

## 2024-05-25 DIAGNOSIS — R09.82 POST-NASAL DRIP: ICD-10-CM

## 2024-05-25 RX ORDER — LEVOCETIRIZINE DIHYDROCHLORIDE 5 MG/1
5 TABLET, FILM COATED ORAL
COMMUNITY
Start: 2024-05-25

## 2024-05-25 RX ORDER — AZELASTINE 1 MG/ML
1 SPRAY, METERED NASAL 2 TIMES DAILY
Qty: 30 ML | Refills: 0 | Status: SHIPPED | OUTPATIENT
Start: 2024-05-25

## 2024-05-25 RX ORDER — METHYLPREDNISOLONE 4 MG/1
TABLET ORAL
Qty: 21 EACH | Refills: 0 | Status: SHIPPED | OUTPATIENT
Start: 2024-05-25

## 2024-05-25 NOTE — PROGRESS NOTES
CHIEF COMPLAINT:    Chief Complaint   Patient presents with    Tonsil Problem       HISTORY OF PRESENT ILLNESS:    Rowena presents today, May 25, 2024, for enlarged tonsils and tonsilliths.  Difficulty eating due to size of tonsils.  Positive for ear fullness.  Last fever 4 days ago, highest temperature of 102.F   Denies sore throat, headaches, sinus pressure, or ear pain.   Has been taking mucinex and sudafed.  Has tried flonase in the past without significant relief.  Denies recent use of daily antihistamine.    ALLERGIES:  No Known Allergies    CURRENT MEDICATIONS:  Current Outpatient Medications   Medication Sig Dispense Refill    Norethindrone Acet-Ethinyl Est (JUNEL 1/20) 1-20 MG-MCG Oral Tab Take 1 tablet by mouth daily. 84 tablet 3       MEDICAL HISTORY:  Past Medical History:    Acne     History reviewed. No pertinent surgical history.  Family History   Problem Relation Age of Onset    Hypertension Father     Hyperlipidemia Father      Family Status   Relation Status    Mo Alive    Fa Alive     Social History     Socioeconomic History    Marital status: Single   Tobacco Use    Smoking status: Never    Smokeless tobacco: Never   Substance and Sexual Activity    Alcohol use: Yes     Alcohol/week: 0.0 standard drinks of alcohol     Comment: socially    Drug use: No     Social Determinants of Health      Received from [facility]    Social Connections    Received from [facility]    Housing Stability       ROS:  GENERAL:  Denies fever today  RESPIRATORY:  Denies difficulty breathing  CARDIAC:  Denies chest pain with exertion      VITALS:   /84   Pulse 79   Temp 99.5 °F (37.5 °C) (Temporal)   Ht 5' 1\" (1.549 m)   Wt 102 lb (46.3 kg)   LMP 04/30/2024 (Approximate)   SpO2 97%   BMI 19.27 kg/m²     Reviewed by Britney Robles MS, APRN, FNP-BC    PHYSICAL EXAM:    Constitutional:       Appears well.  Sitting upright on exam table.  Well developed, well nourished,  and in no acute distress  HEENT:      Facial features symmetric. Normocephalic and atraumatic.  Rhinorrhea.  EAR:      Left ear canal clear.         Right ear canal clear.         Right TM clear and intact, neutral in position.       Left TM clear and intact, neutral in position.   Mouth:        Buccal mucosa is moist and pink.  No ulcerations or lesions.  Uvula rises midline.        Posterior pharynx is erythematous.        Bilateral grade 3+ tonsillar enlargement, without exudate, deformity or lesions.  Cardiovascular:      Heart sounds: Regular rate and rhythm without murmur      No edema of BLE  Pulmonary:      Chest expansion symmetric.  Breathing nonlabored. Lungs clear throughout     No cough.  Musculoskeletal:         Movements smooth and controlled with appropriate coordination.       Gait is steady, nonantalgic.  Neuro:       No focal deficits, cranial nerves grossly intact.       Movements smooth and controlled, appropriate coordination without ataxia or tremors.  Skin:     Warm and dry without jaundice or rashes.  Psychiatric:         Alert and oriented.  Calm and cooperative.  Speech is clear.     ASSESSMENT & PLAN:    1. Tonsillitis  - methylPREDNISolone (MEDROL) 4 MG Oral Tablet Therapy Pack; As directed.  Dispense: 21 each; Refill: 0    2. Post-nasal drip  - methylPREDNISolone (MEDROL) 4 MG Oral Tablet Therapy Pack; As directed.  Dispense: 21 each; Refill: 0  - azelastine 0.1 % Nasal Solution; 1 spray by Nasal route 2 (two) times daily.  Dispense: 30 mL; Refill: 0  - levocetirizine 5 MG Oral Tab; Take 1 tablet (5 mg total) by mouth.    3. Allergic rhinitis, unspecified seasonality, unspecified trigger  - methylPREDNISolone (MEDROL) 4 MG Oral Tablet Therapy Pack; As directed.  Dispense: 21 each; Refill: 0  - azelastine 0.1 % Nasal Solution; 1 spray by Nasal route 2 (two) times daily.  Dispense: 30 mL; Refill: 0  - levocetirizine 5 MG Oral Tab; Take 1 tablet (5 mg total) by mouth.    Trial xyzal and  steroid  Fill nasal spray if needed  Follow-up if failure to improve or worsening symptoms  Considering possibility of sinus infection vs gerd  ENT scheduled August 2024, on wait list

## 2024-06-21 DIAGNOSIS — R09.82 POST-NASAL DRIP: ICD-10-CM

## 2024-06-21 DIAGNOSIS — J30.9 ALLERGIC RHINITIS, UNSPECIFIED SEASONALITY, UNSPECIFIED TRIGGER: ICD-10-CM

## 2024-06-21 RX ORDER — AZELASTINE HYDROCHLORIDE 137 UG/1
1 SPRAY, METERED NASAL 2 TIMES DAILY
Qty: 30 ML | Refills: 0 | Status: SHIPPED | OUTPATIENT
Start: 2024-06-21

## 2024-06-21 NOTE — TELEPHONE ENCOUNTER
Allergy Medication Protocol [id number]/21/2024 10:10 AM   Protocol Details In person appointment or virtual visit in the past 12 mos or appointment in next 3 mos      Refilled per protocol  azelastine 0.1 % Nasal Solution   Last refilled on 5/25/24 #30mL with 0 rf.  LOV- 5/25/24  Last labs- 5/7/24    Sent to pharmacy

## 2024-06-26 ENCOUNTER — TELEPHONE (OUTPATIENT)
Dept: FAMILY MEDICINE CLINIC | Facility: CLINIC | Age: 29
End: 2024-06-26

## 2024-06-26 DIAGNOSIS — Z01.84 IMMUNITY STATUS TESTING: Primary | ICD-10-CM

## 2024-06-26 NOTE — TELEPHONE ENCOUNTER
Pt needs an order for MMR titer test.  Pt needs for school.  Pt scheduled:    Future Appointments   Date Time Provider Department Center   7/3/2024  9:00 AM MELO RAMON NURSE ZAFAR Otero

## 2024-07-03 ENCOUNTER — MED REC SCAN ONLY (OUTPATIENT)
Dept: FAMILY MEDICINE CLINIC | Facility: CLINIC | Age: 29
End: 2024-07-03

## 2024-07-03 ENCOUNTER — NURSE ONLY (OUTPATIENT)
Dept: FAMILY MEDICINE CLINIC | Facility: CLINIC | Age: 29
End: 2024-07-03
Payer: COMMERCIAL

## 2024-07-03 DIAGNOSIS — Z01.84 IMMUNITY STATUS TESTING: ICD-10-CM

## 2024-07-03 PROCEDURE — 86765 RUBEOLA ANTIBODY: CPT | Performed by: FAMILY MEDICINE

## 2024-07-03 PROCEDURE — 86762 RUBELLA ANTIBODY: CPT | Performed by: FAMILY MEDICINE

## 2024-07-03 PROCEDURE — 86735 MUMPS ANTIBODY: CPT | Performed by: FAMILY MEDICINE

## 2024-07-03 NOTE — PROGRESS NOTES
Rowena Morton present in office for nurse visit.  Labs as ordered by Dr. Lau; 22 g, Left AC, and gold tubex2     All questions/concerns addressed. Patient left in stable condition.

## 2024-07-05 LAB
MEV IGG SER-ACNC: 281 AU/ML (ref 16.5–?)
MUV IGG SER IA-ACNC: 5.1 AU/ML (ref 11–?)
RUBV IGG SER QL: POSITIVE
RUBV IGG SER-ACNC: 20.6 IU/ML (ref 10–?)

## 2024-07-18 ENCOUNTER — TELEPHONE (OUTPATIENT)
Dept: FAMILY MEDICINE CLINIC | Facility: CLINIC | Age: 29
End: 2024-07-18

## 2024-07-18 NOTE — TELEPHONE ENCOUNTER
Patient has a nurse visit tomorrow for MMR        Unable to reach the pt . Left detailed message on the voicemail. Instructed to contact the office  with any questions or concerns

## 2024-07-19 ENCOUNTER — NURSE ONLY (OUTPATIENT)
Dept: FAMILY MEDICINE CLINIC | Facility: CLINIC | Age: 29
End: 2024-07-19
Payer: COMMERCIAL

## 2024-07-19 DIAGNOSIS — Z23 NEED FOR VACCINATION: Primary | ICD-10-CM

## 2024-07-19 LAB
CONTROL LINE PRESENT WITH A CLEAR BACKGROUND (YES/NO): YES YES/NO
KIT LOT #: NORMAL NUMERIC
PREGNANCY TEST, URINE: NEGATIVE

## 2024-07-19 PROCEDURE — 90707 MMR VACCINE SC: CPT | Performed by: FAMILY MEDICINE

## 2024-07-19 PROCEDURE — 81025 URINE PREGNANCY TEST: CPT | Performed by: FAMILY MEDICINE

## 2024-07-19 PROCEDURE — 90471 IMMUNIZATION ADMIN: CPT | Performed by: FAMILY MEDICINE

## 2024-07-19 NOTE — PROGRESS NOTES
Patient's name and  verified     Pregnancy test run due to live virus- test negative.     Verified medication, dose and expiration Rosalie Maloney RN    Administered MMR vaccine subcutaneous in the right arm.     Patient notified and verbalized an understanding

## 2024-08-19 ENCOUNTER — APPOINTMENT (OUTPATIENT)
Dept: OTOLARYNGOLOGY | Age: 29
End: 2024-08-19

## 2024-09-09 ENCOUNTER — APPOINTMENT (OUTPATIENT)
Dept: OTOLARYNGOLOGY | Age: 29
End: 2024-09-09

## 2025-01-20 ENCOUNTER — MED REC SCAN ONLY (OUTPATIENT)
Dept: FAMILY MEDICINE CLINIC | Facility: CLINIC | Age: 30
End: 2025-01-20

## 2025-03-29 ENCOUNTER — MED REC SCAN ONLY (OUTPATIENT)
Dept: FAMILY MEDICINE CLINIC | Facility: CLINIC | Age: 30
End: 2025-03-29

## (undated) DIAGNOSIS — R87.610 ASCUS WITH POSITIVE HIGH RISK HPV CERVICAL: ICD-10-CM

## (undated) DIAGNOSIS — R87.810 ASCUS WITH POSITIVE HIGH RISK HPV CERVICAL: ICD-10-CM

## (undated) DIAGNOSIS — R87.610 ATYPICAL SQUAMOUS CELLS OF UNDETERMINED SIGNIFICANCE ON CYTOLOGIC SMEAR OF CERVIX (ASC-US): Primary | ICD-10-CM

## (undated) NOTE — LETTER
03/05/20        Rowena KIM [address] [facility] [id number]      Dear Dusty Silveira records indicate that you have outstanding lab work and or testing that was ordered for you and has not yet been completed:  Lab Frequency Next Tacey Marking